# Patient Record
Sex: FEMALE | Race: WHITE
[De-identification: names, ages, dates, MRNs, and addresses within clinical notes are randomized per-mention and may not be internally consistent; named-entity substitution may affect disease eponyms.]

---

## 2013-06-12 VITALS — TEMPERATURE: 97.6 F | TEMPERATURE: 97.6 F | DIASTOLIC BLOOD PRESSURE: 71 MMHG | SYSTOLIC BLOOD PRESSURE: 132 MMHG

## 2017-05-05 ENCOUNTER — HOSPITAL ENCOUNTER (EMERGENCY)
Dept: HOSPITAL 58 - ED | Age: 82
Discharge: HOME | End: 2017-05-05
Payer: COMMERCIAL

## 2017-05-05 VITALS — TEMPERATURE: 99.3 F | SYSTOLIC BLOOD PRESSURE: 143 MMHG | DIASTOLIC BLOOD PRESSURE: 78 MMHG

## 2017-05-05 VITALS — BODY MASS INDEX: 31.9 KG/M2

## 2017-05-05 DIAGNOSIS — S90.812A: ICD-10-CM

## 2017-05-05 DIAGNOSIS — S92.402A: Primary | ICD-10-CM

## 2017-05-05 PROCEDURE — 96372 THER/PROPH/DIAG INJ SC/IM: CPT

## 2017-05-05 PROCEDURE — 99283 EMERGENCY DEPT VISIT LOW MDM: CPT

## 2017-05-05 RX ADMIN — LIDOCAINE HYDROCHLORIDE STA ML: 10 INJECTION, SOLUTION EPIDURAL; INFILTRATION; INTRACAUDAL; PERINEURAL at 18:39

## 2017-05-05 RX ADMIN — CEFTRIAXONE SODIUM STA GM: 1 INJECTION, POWDER, FOR SOLUTION INTRAMUSCULAR; INTRAVENOUS at 18:40

## 2017-05-05 NOTE — ED.PDOC
General


ED Provider: 


Dr. BRAYDON SAEED





Chief Complaint: Wound Check


Stated Complaint: LEFT FOOT GREAT TOE INJURY


Time Seen by Physician: 17:29 (HISTORY NEUROPATHYX 13 YEARS CANT FEEL LOWER 

LEGS )


Mode of Arrival: Walk-In


Information Source: Patient


Exam Limitations: No limitations


Primary Care Provider: 


SATURNINO MA





Nursing and Triage Documentation Reviewed and Agree: Yes (POSSIBLE TRAUMA IN ED 

FOR WOUND CHECK)





Musculoskeletal Complaint Exam





- Ankle/Foot Complaint/Exam


Location of Injury: Reports: Left, Foot, Toe #1


Mechanism of Injury: Reports: Trauma (IS POSSIBLE SEE PHOTOS)


Onset/Duration:  PT NOTICED IT WENDSDAY


Symptoms Are: Reports: Still present (SEE PHOTOS)


Onset of Pain: Reports: Hours


Initial Severity: Mild


Current Severity: Mild


Location: Reports: Discrete (1ST TOE)


Character: Reports: Aching


Alleviating: Reports: None, Rest


Aggravating: Reports: None


Able to Bear Weight: Yes


Associated Signs and Symptoms: Reports: Swelling, Redness, Bruising (LACERATION 

1ST TOE)


Gout Risk Factors: Reports: >40 years old


Lower Extremity Findings: Present: Swelling, Ecchymosis





Review of Systems





- Review Of Systems


Constitutional: Reports: No symptoms


Eyes: Reports: No symptoms


Ears, Nose, Mouth, Throat: Reports: No symptoms


Respiratory: Reports: No symptoms


Cardiac: Reports: No symptoms


GI: Reports: No symptoms


: Reports: No symptoms


Musculoskeletal: Reports: Other (PAIN LEFT 1ST TOE)


Skin: Reports: No symptoms


Neurological: Reports: No symptoms


Endocrine: Reports: No symptoms


Hematologic/Lymphatic: Reports: No symptoms


All Other Systems: Reviewed and Negative





Past Medical History





- Past Medical History


Previously Healthy: No


Endocrine: Reports: DM 2


Cardiovascular: Reports: Hypertension


Respiratory: Reports: None


Hematological: Reports: Unknown


Gastrointestinal: Reports: GERD


Genitourinary: Reports: None


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


Musculoskeletal: Reports: Arthritis


Cancer: Reports: Unknown


Last Menstrual Period: hysterectomy





- Surgical History


General Surgical History: Reports: Unknown





- Family History


Family History: Reports: Unknown





- Social History


Smoking Status: Never smoker


Hx Substance Use: No


Alcohol Screening: None





Physical Exam





- Physical Exam


Appearance: Well-appearing, No pain distress, Well-nourished


Eyes: DOTTIE, EOMI, Conjunctiva clear


ENT: Ears normal, Nose normal, Oropharynx normal


Respiratory: Airway patent, Breath sounds clear, Breath sounds equal, 

Respirations nonlabored


Cardiovascular: RRR, Pulses normal, No rub, No murmur


GI/: Soft, Nontender, No masses, Bowel sounds normal, No Organomegaly


Musculoskeletal: Normal strength, ROM intact, No edema, No calf tenderness


Skin: Warm, Dry (SKIN TEAR AND BRUSING ON THE 1ST TOE LEFT FOOT)


Neurological: Sensation intact, Motor intact, Reflexes intact, Cranial nerves 

intact, Alert, Oriented


Psychiatric: Affect appropriate, Mood appropriate





Critical Care Note





- Critical Care Note


Total Time (mins): 0





Course





- Course


Orders, Labs, Meds: 





Orders











 Category Date Time Status


 


 FOOT, LEFT 3 VIEWS Stat RADS  05/05/17 17:48 Ordered











Vital Signs: 





 











  Temp Pulse Resp BP Pulse Ox


 


 05/05/17 17:14  99.3 F  103 H  20  143/78 H  94 L














Departure





- Departure


Time of Disposition: 17:54 (WITH EVA AT BEDSIDE DID  CHECK THE PULSES OF THE 

LOWER EXT MANUALY AND WITH NEREYDAPLER ALL PULSES WERE PRESENT STRONG . EVA 

PRESENT)


Disposition: HOME SELF-CARE


Discharge Problem: 


 Wound





Foot abrasion


Qualifiers:


 Encounter type: initial encounter Laterality: left Qualifier Code: (S90.812A) 

Abrasion, left foot, initial encounter





Contusion of toe, left


Qualifiers:


 Encounter type: initial encounter Toe: great toe Damage to nail status: with 

damage Qualifier Code: (S90.212A) Contusion of left great toe with damage to 

nail, initial encounter





Instructions:  Laceration (ED)


Condition: Good


Pt referred to PMD for follow-up: No


Additional Instructions: 


Please call your Family Physician as soon as possible to schedule a follow-up 

appointment.


Allergies/Adverse Reactions: 


Allergies





Penicillins Adverse Reaction (Verified 05/05/17 17:23)


 








Home Medications: 


Ambulatory Orders





Albuterol Sulfate [Proair Hfa] 4 puff INH QID 06/12/13 


Budesonide [Rhinocort Aqua] 32 mcg NS DAILY PRN 06/12/13 


Celecoxib [Celebrex] 100 mg PO DAILY 06/12/13 


Duloxetine HCl [Cymbalta] 60 mg PO DAILY 06/12/13 


Esomeprazole Magnesium [Nexium] 40 mg PO DAILY 06/12/13 


Fexofenadine HCl [Allegra] 180 mg PO DAILY 06/12/13 


Glyburide [Diabeta] 2.5 mg PO 1-2XD 06/12/13 


Montelukast Sodium [Singulair] 10 mg PO ONCE 06/12/13 


Valsartan [Diovan] 320 mg PO DAILY 06/12/13 


Cholecalciferol (Vitamin D3) [Vitamin D3] 1,000 unit PO DAILY 05/05/17 


Glucosamine HCl/Chondr Coughlin A Na [Osteo Bi-Flex Caplet] 1 each PO BID 05/05/17 


Hydrocodone/Acetaminophen [Hydrocodon-Acetaminophen 5-325] 1 each PO Q6HR PRN 05 /05/17 


Sitagliptin Phosphate [Januvia] 50 mg PO DAILY 05/05/17

## 2017-05-05 NOTE — DI
EXAM:  Left foot, three views, 05/05/2017 

  

HISTORY:  Trauma 

  

COMPARISON:  None. 

  

FINDINGS / IMPRESSION:  The distal aspect of the second metatarsal is absent.  Joint space narrowing
 at the interphalangeal joint of the great toe.  Bony erosion within the base of the distal phalanx.
 A jagged lucency also traverses the proximal aspect of the distal phalanx of the great toe.  This c
ould represent acute fracture.  Please correlate with physical examination.  This is age indetermina
te.  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  Joint space narro
wing with bulky dorsal osteophyte formation.  Multiple sites of articular sclerosis and endplate ero
viv.

## 2017-05-06 ENCOUNTER — HOSPITAL ENCOUNTER (OUTPATIENT)
Dept: HOSPITAL 58 - OPMED | Age: 82
Discharge: HOME | End: 2017-05-06
Attending: INTERNAL MEDICINE

## 2017-05-06 VITALS — DIASTOLIC BLOOD PRESSURE: 68 MMHG | SYSTOLIC BLOOD PRESSURE: 120 MMHG | TEMPERATURE: 98.6 F

## 2017-05-06 VITALS — BODY MASS INDEX: 31.9 KG/M2

## 2017-05-06 DIAGNOSIS — S92.402B: Primary | ICD-10-CM

## 2017-05-06 DIAGNOSIS — L03.032: ICD-10-CM

## 2017-05-06 PROCEDURE — 96372 THER/PROPH/DIAG INJ SC/IM: CPT

## 2017-05-06 RX ADMIN — CEFTRIAXONE SODIUM SCH GM: 1 INJECTION, POWDER, FOR SOLUTION INTRAMUSCULAR; INTRAVENOUS at 15:59

## 2017-05-07 ENCOUNTER — HOSPITAL ENCOUNTER (OUTPATIENT)
Dept: HOSPITAL 58 - OPMED | Age: 82
Discharge: HOME | End: 2017-05-07
Attending: INTERNAL MEDICINE

## 2017-05-07 VITALS — TEMPERATURE: 98.1 F | SYSTOLIC BLOOD PRESSURE: 132 MMHG | DIASTOLIC BLOOD PRESSURE: 68 MMHG

## 2017-05-07 VITALS — BODY MASS INDEX: 31.9 KG/M2

## 2017-05-07 DIAGNOSIS — L03.032: ICD-10-CM

## 2017-05-07 DIAGNOSIS — S92.402B: Primary | ICD-10-CM

## 2017-05-07 PROCEDURE — 96372 THER/PROPH/DIAG INJ SC/IM: CPT

## 2017-05-07 RX ADMIN — LIDOCAINE HYDROCHLORIDE STA ML: 10 INJECTION, SOLUTION EPIDURAL; INFILTRATION; INTRACAUDAL; PERINEURAL at 15:40

## 2017-05-07 RX ADMIN — CEFTRIAXONE SODIUM STA GM: 1 INJECTION, POWDER, FOR SOLUTION INTRAMUSCULAR; INTRAVENOUS at 15:39

## 2017-05-07 RX ADMIN — LIDOCAINE HYDROCHLORIDE ONE: 20 JELLY TOPICAL at 15:41

## 2018-08-11 ENCOUNTER — HOSPITAL ENCOUNTER (OUTPATIENT)
Dept: HOSPITAL 58 - AMBL | Age: 83
Discharge: ANOTHER HOSPITAL | End: 2018-08-11
Attending: INTERNAL MEDICINE

## 2018-08-11 VITALS — BODY MASS INDEX: 31.9 KG/M2

## 2018-08-11 DIAGNOSIS — G62.9: ICD-10-CM

## 2018-08-11 DIAGNOSIS — I10: ICD-10-CM

## 2018-08-11 DIAGNOSIS — R50.9: ICD-10-CM

## 2018-08-11 DIAGNOSIS — M79.672: ICD-10-CM

## 2018-08-11 DIAGNOSIS — S92.912D: ICD-10-CM

## 2018-08-11 DIAGNOSIS — R53.1: Primary | ICD-10-CM

## 2018-08-11 DIAGNOSIS — E11.9: ICD-10-CM
